# Patient Record
Sex: MALE | Race: WHITE | URBAN - NONMETROPOLITAN AREA
[De-identification: names, ages, dates, MRNs, and addresses within clinical notes are randomized per-mention and may not be internally consistent; named-entity substitution may affect disease eponyms.]

---

## 2022-09-16 ENCOUNTER — TELEPHONE ENCOUNTER (OUTPATIENT)
Dept: URBAN - NONMETROPOLITAN AREA CLINIC 2 | Facility: CLINIC | Age: 27
End: 2022-09-16

## 2022-09-26 ENCOUNTER — OFFICE VISIT (OUTPATIENT)
Dept: URBAN - NONMETROPOLITAN AREA CLINIC 13 | Facility: CLINIC | Age: 27
End: 2022-09-26

## 2022-10-20 ENCOUNTER — CLAIMS CREATED FROM THE CLAIM WINDOW (OUTPATIENT)
Dept: URBAN - NONMETROPOLITAN AREA CLINIC 13 | Facility: CLINIC | Age: 27
End: 2022-10-20
Payer: MEDICAID

## 2022-10-20 ENCOUNTER — WEB ENCOUNTER (OUTPATIENT)
Dept: URBAN - NONMETROPOLITAN AREA CLINIC 13 | Facility: CLINIC | Age: 27
End: 2022-10-20

## 2022-10-20 VITALS
HEART RATE: 71 BPM | BODY MASS INDEX: 43.44 KG/M2 | WEIGHT: 276.8 LBS | HEIGHT: 67 IN | DIASTOLIC BLOOD PRESSURE: 82 MMHG | SYSTOLIC BLOOD PRESSURE: 129 MMHG

## 2022-10-20 DIAGNOSIS — K59.01 CONSTIPATION BY DELAYED COLONIC TRANSIT: ICD-10-CM

## 2022-10-20 DIAGNOSIS — K92.1 HEMATOCHEZIA: ICD-10-CM

## 2022-10-20 DIAGNOSIS — R19.5 POSITIVE FIT (FECAL IMMUNOCHEMICAL TEST): ICD-10-CM

## 2022-10-20 DIAGNOSIS — K59.09 CHANGE IN BOWEL MOVEMENTS INTERMITTENT CONSTIPATION. URGENCY IN THE MORNING.: ICD-10-CM

## 2022-10-20 PROBLEM — 35298007: Status: ACTIVE | Noted: 2022-10-20

## 2022-10-20 PROCEDURE — 99244 OFF/OP CNSLTJ NEW/EST MOD 40: CPT | Performed by: INTERNAL MEDICINE

## 2022-10-20 PROCEDURE — 99204 OFFICE O/P NEW MOD 45 MIN: CPT | Performed by: INTERNAL MEDICINE

## 2022-10-20 RX ORDER — METFORMIN ER 500 MG 500 MG/1
TAKE 1 TABLET BY MOUTH EVERY DAY WITH MEALS FOR 30 DAYS TABLET ORAL
Qty: 30 EACH | Refills: 0 | Status: ACTIVE | COMMUNITY

## 2022-10-20 NOTE — HPI-TODAY'S VISIT:
Bobby is a very pleasant 26-year-old gentleman referred by SELMA Rodriguez in [place] for rectal bleeding and positive fit test.  He lives in [place] with a roommate.  He states that he has intermittent rectal bleeding.  He is sometimes constipated.  He was evaluated by this locally and a fit test was performed for some reason.  This was apparently positive.  He does state that he has bleeding a couple times a week.  It is not associated with any abdominal pain, fevers, or chills.  His weight is stable.  He is currently unemployed.  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

## 2022-11-09 ENCOUNTER — OFFICE VISIT (OUTPATIENT)
Dept: URBAN - NONMETROPOLITAN AREA CLINIC 2 | Facility: CLINIC | Age: 27
End: 2022-11-09

## 2022-11-16 ENCOUNTER — TELEPHONE ENCOUNTER (OUTPATIENT)
Dept: URBAN - METROPOLITAN AREA CLINIC 23 | Facility: CLINIC | Age: 27
End: 2022-11-16

## 2022-12-20 ENCOUNTER — CLAIMS CREATED FROM THE CLAIM WINDOW (OUTPATIENT)
Dept: URBAN - NONMETROPOLITAN AREA SURGERY CENTER 1 | Facility: SURGERY CENTER | Age: 27
End: 2022-12-20
Payer: MEDICAID

## 2022-12-20 ENCOUNTER — OFFICE VISIT (OUTPATIENT)
Dept: URBAN - NONMETROPOLITAN AREA SURGERY CENTER 1 | Facility: SURGERY CENTER | Age: 27
End: 2022-12-20

## 2022-12-20 DIAGNOSIS — K92.1 ACUTE MELENA: ICD-10-CM

## 2022-12-20 PROCEDURE — 45378 DIAGNOSTIC COLONOSCOPY: CPT | Performed by: INTERNAL MEDICINE

## 2022-12-20 PROCEDURE — G8907 PT DOC NO EVENTS ON DISCHARG: HCPCS | Performed by: INTERNAL MEDICINE

## 2023-02-08 ENCOUNTER — OFFICE VISIT (OUTPATIENT)
Dept: URBAN - NONMETROPOLITAN AREA CLINIC 2 | Facility: CLINIC | Age: 28
End: 2023-02-08

## 2023-02-08 ENCOUNTER — LAB OUTSIDE AN ENCOUNTER (OUTPATIENT)
Dept: URBAN - NONMETROPOLITAN AREA CLINIC 2 | Facility: CLINIC | Age: 28
End: 2023-02-08

## 2023-02-08 ENCOUNTER — OFFICE VISIT (OUTPATIENT)
Dept: URBAN - NONMETROPOLITAN AREA CLINIC 2 | Facility: CLINIC | Age: 28
End: 2023-02-08
Payer: MEDICAID

## 2023-02-08 VITALS
WEIGHT: 281 LBS | BODY MASS INDEX: 45.16 KG/M2 | SYSTOLIC BLOOD PRESSURE: 127 MMHG | HEIGHT: 66 IN | DIASTOLIC BLOOD PRESSURE: 84 MMHG | HEART RATE: 96 BPM

## 2023-02-08 DIAGNOSIS — R11.2 NAUSEA AND VOMITING, UNSPECIFIED VOMITING TYPE: ICD-10-CM

## 2023-02-08 DIAGNOSIS — K64.9 HEMORRHOIDS, UNSPECIFIED HEMORRHOID TYPE: ICD-10-CM

## 2023-02-08 DIAGNOSIS — R14.0 BLOATING: ICD-10-CM

## 2023-02-08 DIAGNOSIS — E11.65 TYPE 2 DIABETES MELLITUS WITH HYPERGLYCEMIA, WITHOUT LONG-TERM CURRENT USE OF INSULIN: ICD-10-CM

## 2023-02-08 DIAGNOSIS — K21.9 GASTROESOPHAGEAL REFLUX DISEASE, UNSPECIFIED WHETHER ESOPHAGITIS PRESENT: ICD-10-CM

## 2023-02-08 PROBLEM — 16932000: Status: ACTIVE | Noted: 2023-02-08

## 2023-02-08 PROBLEM — 70153002: Status: ACTIVE | Noted: 2023-02-08

## 2023-02-08 PROBLEM — 44054006: Status: ACTIVE | Noted: 2023-02-08

## 2023-02-08 PROBLEM — 116289008: Status: ACTIVE | Noted: 2023-02-08

## 2023-02-08 PROBLEM — 235595009: Status: ACTIVE | Noted: 2023-02-08

## 2023-02-08 PROCEDURE — 99214 OFFICE O/P EST MOD 30 MIN: CPT

## 2023-02-08 NOTE — HPI-TODAY'S VISIT:
2/8/2023 Bobby returns to clinic following his colonoscopy with Dr. Laurent 12/2022 which showed normal colon with only hemorrhoid as a finding.  Today he would like to discuss continued rectal bleeding and a new complaint of mid epigastric abdominal discomfort he describes as twisting, tightness which he associates with his history of GERD however it has worsened recently.  At times it does wake him up at night.  He started omeprazole 20 mg with PCP but self initiated a double dose to 40 mg stating some improvement but continues with the abdominal pain.  In addition he is experiencing nausea/vomiting and regurgitation that is worse with larger meals, activity, getting overheated or excited.  At times he has acid regurgitate into his mouth and has had pickles stuck when swallowing, drinking water eventually resolve this.  He has moved to smaller meals with some improvement.  He denies any use of NSAIDs only drinks 1 unit of alcohol occasionally once per week.  Denies any drug use.  States he constantly has bowel movements however also states that he skips some days of bowel movements.  Seems to be a vague report on quantity and frequency.  However he does endorse straining and some constipation.  Of note sometimes he notices his abdomen is bloated to the point where his T-shirt will not fit.  He had a new diagnosis of type 2 diabetes in April of last year and has started metformin, does not have tight control on blood glucose. Lives at home not working but interested in trying to be a .  SP

## 2023-02-09 LAB
A/G RATIO: 1.5
ABSOLUTE BASOPHILS: 36
ABSOLUTE EOSINOPHILS: 214
ABSOLUTE LYMPHOCYTES: 3578
ABSOLUTE MONOCYTES: 561
ABSOLUTE NEUTROPHILS: 4512
ALBUMIN: 4.1
ALKALINE PHOSPHATASE: 56
ALT (SGPT): 68
AST (SGOT): 48
BASOPHILS: 0.4
BILIRUBIN, TOTAL: 0.3
BUN/CREATININE RATIO: 9
BUN: 6
C-REACTIVE PROTEIN, QUANT: 4.8
CALCIUM: 9.4
CARBON DIOXIDE, TOTAL: 23
CHLORIDE: 102
CREATININE: 0.7
EGFR: 129
EOSINOPHILS: 2.4
GLOBULIN, TOTAL: 2.8
GLUCOSE: 112
HEMATOCRIT: 41.3
HEMOGLOBIN: 13.8
IMMUNOGLOBULIN A: 347
INTERPRETATION: (no result)
IRON BIND.CAP.(TIBC): 372
IRON SATURATION: 12
IRON: 43
LYMPHOCYTES: 40.2
MAGNESIUM: 1.8
MCH: 28.2
MCHC: 33.4
MCV: 84.5
MONOCYTES: 6.3
MPV: 10.2
NEUTROPHILS: 50.7
PLATELET COUNT: 313
POTASSIUM: 4.3
PROTEIN, TOTAL: 6.9
RDW: 13.1
RED BLOOD CELL COUNT: 4.89
SED RATE BY MODIFIED: 9
SODIUM: 141
TISSUE TRANSGLUTAMINASE AB, IGA: <1
TSH W/REFLEX TO FT4: 2.12
VITAMIN B12: 377
WHITE BLOOD CELL COUNT: 8.9

## 2023-02-15 LAB
HELICOBACTER PYLORI AG, EIA, STOOL: (no result)
OCCULT BLOOD, FECAL, IA: (no result)

## 2023-02-21 ENCOUNTER — TELEPHONE ENCOUNTER (OUTPATIENT)
Dept: URBAN - NONMETROPOLITAN AREA CLINIC 2 | Facility: CLINIC | Age: 28
End: 2023-02-21

## 2023-04-05 ENCOUNTER — DASHBOARD ENCOUNTERS (OUTPATIENT)
Age: 28
End: 2023-04-05

## 2023-04-05 ENCOUNTER — OFFICE VISIT (OUTPATIENT)
Dept: URBAN - NONMETROPOLITAN AREA CLINIC 2 | Facility: CLINIC | Age: 28
End: 2023-04-05
Payer: MEDICAID

## 2023-04-05 VITALS
HEART RATE: 116 BPM | SYSTOLIC BLOOD PRESSURE: 153 MMHG | DIASTOLIC BLOOD PRESSURE: 89 MMHG | BODY MASS INDEX: 40.18 KG/M2 | HEIGHT: 66 IN | WEIGHT: 250 LBS

## 2023-04-05 DIAGNOSIS — L02.91 PURULENT ABSCESS: ICD-10-CM

## 2023-04-05 DIAGNOSIS — K59.00 CONSTIPATION, UNSPECIFIED CONSTIPATION TYPE: ICD-10-CM

## 2023-04-05 DIAGNOSIS — R10.30 INGUINAL PAIN, UNSPECIFIED LATERALITY: ICD-10-CM

## 2023-04-05 DIAGNOSIS — R15.9 INCONTINENCE OF FECES, UNSPECIFIED FECAL INCONTINENCE TYPE: ICD-10-CM

## 2023-04-05 DIAGNOSIS — M79.18 ACUTE BUTTOCK PAIN: ICD-10-CM

## 2023-04-05 PROBLEM — 14760008: Status: ACTIVE | Noted: 2023-04-05

## 2023-04-05 PROBLEM — 279043006: Status: ACTIVE | Noted: 2023-04-05

## 2023-04-05 PROBLEM — 102570003: Status: ACTIVE | Noted: 2023-04-05

## 2023-04-05 PROBLEM — 72042002: Status: ACTIVE | Noted: 2023-04-05

## 2023-04-05 PROBLEM — 77880009: Status: ACTIVE | Noted: 2023-04-05

## 2023-04-05 PROBLEM — 128477000: Status: ACTIVE | Noted: 2023-04-05

## 2023-04-05 PROCEDURE — 99213 OFFICE O/P EST LOW 20 MIN: CPT

## 2023-04-05 RX ORDER — FAMOTIDINE 40 MG/1
1 TABLET AT BEDTIME TABLET, FILM COATED ORAL
Status: ACTIVE | COMMUNITY

## 2023-04-05 NOTE — PHYSICAL EXAM GASTROINTESTINAL
Abdomen , soft, nontender, nondistended , normal bowel sounds- rectal examination visual with right buttock erythema and two fistula openings bilaterally past the dentate line with active drainage -mixed fecal/purulent/serosanguinous

## 2023-04-05 NOTE — HPI-TODAY'S VISIT:
2/8/2023 Bobby returns to clinic following his colonoscopy with Dr. Laurent 12/2022 which showed normal colon with only hemorrhoid as a finding.  Today he would like to discuss continued rectal bleeding and a new complaint of mid epigastric abdominal discomfort he describes as twisting, tightness which he associates with his history of GERD however it has worsened recently.  At times it does wake him up at night.  He started omeprazole 20 mg with PCP but self initiated a double dose to 40 mg stating some improvement but continues with the abdominal pain.  In addition he is experiencing nausea/vomiting and regurgitation that is worse with larger meals, activity, getting overheated or excited.  At times he has acid regurgitate into his mouth and has had pickles stuck when swallowing, drinking water eventually resolve this.  He has moved to smaller meals with some improvement.  He denies any use of NSAIDs only drinks 1 unit of alcohol occasionally once per week.  Denies any drug use.  States he constantly has bowel movements however also states that he skips some days of bowel movements.  Seems to be a vague report on quantity and frequency.  However he does endorse straining and some constipation.  Of note sometimes he notices his abdomen is bloated to the point where his T-shirt will not fit.  He had a new diagnosis of type 2 diabetes in April of last year and has started metformin, does not have tight control on blood glucose. Lives at home not working but interested in trying to be a .  [facility]  4/5/2023 Patient returns for follow up, states he thought he had developed a hernia due to a swollen, painful area in his groin starting to progress notably 1 week ago. On evaluation in clinic he was experiencing new fecal incontinence and was not able to sit down secondary to severe pain. He reports continued constipation prior to follow up. He reported fevers over the last week and was tachycardic on intake. He continued to have rectal output that was a mix of malodorous purulent, fecal and serosanguineous drainage. He endorsed a sensation of something "popping".  He agreed to rectal evaluation which revealed two actively draining perirectal openings past the dentate line as well what appears to be a right sided rectal abscess, firm with erythema, painful to pressure. He elected to have EMS transfer him to the hospital for evaluation given his limited state. [facility]